# Patient Record
Sex: FEMALE | Race: WHITE | NOT HISPANIC OR LATINO | Employment: OTHER | URBAN - METROPOLITAN AREA
[De-identification: names, ages, dates, MRNs, and addresses within clinical notes are randomized per-mention and may not be internally consistent; named-entity substitution may affect disease eponyms.]

---

## 2017-09-13 ENCOUNTER — AMBULATORY - HEALTHEAST (OUTPATIENT)
Dept: PHYSICAL MEDICINE AND REHAB | Facility: CLINIC | Age: 77
End: 2017-09-13

## 2017-09-13 DIAGNOSIS — M54.16 LUMBAR RADICULOPATHY: ICD-10-CM

## 2017-09-14 ENCOUNTER — HOSPITAL ENCOUNTER (OUTPATIENT)
Dept: PHYSICAL MEDICINE AND REHAB | Facility: CLINIC | Age: 77
Discharge: HOME | End: 2017-09-14
Attending: FAMILY MEDICINE

## 2017-09-14 DIAGNOSIS — M79.18 MYOFASCIAL PAIN: ICD-10-CM

## 2017-09-14 DIAGNOSIS — M25.551 RIGHT HIP PAIN: ICD-10-CM

## 2017-09-14 DIAGNOSIS — M43.16 SPONDYLOLISTHESIS AT L4-L5 LEVEL: ICD-10-CM

## 2017-09-14 DIAGNOSIS — M54.16 LUMBAR RADICULITIS: ICD-10-CM

## 2017-09-14 DIAGNOSIS — M43.16 SPONDYLOLISTHESIS AT L3-L4 LEVEL: ICD-10-CM

## 2017-09-14 DIAGNOSIS — M47.816 FACET ARTHROPATHY, LUMBAR: ICD-10-CM

## 2017-09-14 DIAGNOSIS — M54.50 LUMBALGIA: ICD-10-CM

## 2017-09-14 ASSESSMENT — MIFFLIN-ST. JEOR: SCORE: 1089.34

## 2017-09-25 ENCOUNTER — HOSPITAL ENCOUNTER (OUTPATIENT)
Dept: RADIOLOGY | Facility: HOSPITAL | Age: 77
Discharge: HOME | End: 2017-09-25
Attending: PHYSICIAN ASSISTANT

## 2017-09-25 ENCOUNTER — HOSPITAL ENCOUNTER (OUTPATIENT)
Dept: MRI IMAGING | Facility: HOSPITAL | Age: 77
Discharge: HOME | End: 2017-09-25
Attending: PHYSICIAN ASSISTANT

## 2017-09-25 DIAGNOSIS — M12.88 OTHER SPECIFIC ARTHROPATHIES, NOT ELSEWHERE CLASSIFIED, OTHER SPECIFIED SITE: ICD-10-CM

## 2017-09-25 DIAGNOSIS — M79.18 MYOFASCIAL PAIN: ICD-10-CM

## 2017-09-25 DIAGNOSIS — M43.16 SPONDYLOLISTHESIS AT L4-L5 LEVEL: ICD-10-CM

## 2017-09-25 DIAGNOSIS — M54.16 LUMBAR RADICULITIS: ICD-10-CM

## 2017-09-25 DIAGNOSIS — M43.16 SPONDYLOLISTHESIS AT L3-L4 LEVEL: ICD-10-CM

## 2017-09-25 DIAGNOSIS — M54.50 LUMBALGIA: ICD-10-CM

## 2017-09-25 DIAGNOSIS — M47.816 FACET ARTHROPATHY, LUMBAR: ICD-10-CM

## 2017-09-25 DIAGNOSIS — M25.551 RIGHT HIP PAIN: ICD-10-CM

## 2017-09-28 ENCOUNTER — HOSPITAL ENCOUNTER (OUTPATIENT)
Dept: PHYSICAL MEDICINE AND REHAB | Facility: CLINIC | Age: 77
Discharge: HOME | End: 2017-09-28
Attending: PHYSICIAN ASSISTANT

## 2017-09-28 DIAGNOSIS — M79.18 MYOFASCIAL PAIN: ICD-10-CM

## 2017-09-28 DIAGNOSIS — M43.16 SPONDYLOLISTHESIS AT L4-L5 LEVEL: ICD-10-CM

## 2017-09-28 DIAGNOSIS — M25.551 RIGHT HIP PAIN: ICD-10-CM

## 2017-09-28 DIAGNOSIS — M54.16 LUMBAR RADICULITIS: ICD-10-CM

## 2017-09-28 DIAGNOSIS — M43.16 SPONDYLOLISTHESIS AT L3-L4 LEVEL: ICD-10-CM

## 2017-09-28 DIAGNOSIS — M47.816 FACET ARTHROPATHY, LUMBAR: ICD-10-CM

## 2017-09-28 DIAGNOSIS — M54.50 LUMBALGIA: ICD-10-CM

## 2017-09-29 ENCOUNTER — HOSPITAL ENCOUNTER (OUTPATIENT)
Dept: PHYSICAL MEDICINE AND REHAB | Facility: CLINIC | Age: 77
Discharge: HOME | End: 2017-09-29
Attending: PHYSICIAN ASSISTANT

## 2017-09-29 DIAGNOSIS — M43.16 SPONDYLOLISTHESIS AT L4-L5 LEVEL: ICD-10-CM

## 2017-09-29 DIAGNOSIS — M54.16 LUMBAR RADICULITIS: ICD-10-CM

## 2017-09-29 DIAGNOSIS — M43.16 SPONDYLOLISTHESIS AT L3-L4 LEVEL: ICD-10-CM

## 2017-09-29 DIAGNOSIS — M79.18 MYOFASCIAL PAIN: ICD-10-CM

## 2017-10-17 ENCOUNTER — HOSPITAL ENCOUNTER (OUTPATIENT)
Dept: PHYSICAL MEDICINE AND REHAB | Facility: CLINIC | Age: 77
Discharge: HOME | End: 2017-10-17
Attending: PHYSICIAN ASSISTANT

## 2017-10-17 DIAGNOSIS — M54.16 LUMBAR RADICULITIS: ICD-10-CM

## 2017-10-17 DIAGNOSIS — M25.551 RIGHT HIP PAIN: ICD-10-CM

## 2017-10-17 DIAGNOSIS — M54.50 LUMBALGIA: ICD-10-CM

## 2017-10-17 DIAGNOSIS — M43.16 SPONDYLOLISTHESIS AT L4-L5 LEVEL: ICD-10-CM

## 2017-10-17 DIAGNOSIS — M79.18 MYOFASCIAL PAIN: ICD-10-CM

## 2017-10-17 DIAGNOSIS — M43.16 SPONDYLOLISTHESIS AT L3-L4 LEVEL: ICD-10-CM

## 2017-10-17 ASSESSMENT — MIFFLIN-ST. JEOR: SCORE: 1087.98

## 2017-10-18 ENCOUNTER — OFFICE VISIT - HEALTHEAST (OUTPATIENT)
Dept: PHYSICAL THERAPY | Facility: CLINIC | Age: 77
End: 2017-10-18

## 2017-10-18 DIAGNOSIS — M54.50 CHRONIC BILATERAL LOW BACK PAIN WITHOUT SCIATICA: ICD-10-CM

## 2017-10-18 DIAGNOSIS — R29.898 WEAKNESS OF BOTH HIPS: ICD-10-CM

## 2017-10-18 DIAGNOSIS — G89.29 CHRONIC BILATERAL LOW BACK PAIN WITHOUT SCIATICA: ICD-10-CM

## 2017-10-18 DIAGNOSIS — M25.551 PAIN OF RIGHT HIP JOINT: ICD-10-CM

## 2017-10-27 ENCOUNTER — OFFICE VISIT - HEALTHEAST (OUTPATIENT)
Dept: PHYSICAL THERAPY | Facility: CLINIC | Age: 77
End: 2017-10-27

## 2017-10-27 DIAGNOSIS — M54.50 CHRONIC BILATERAL LOW BACK PAIN WITHOUT SCIATICA: ICD-10-CM

## 2017-10-27 DIAGNOSIS — G89.29 CHRONIC BILATERAL LOW BACK PAIN WITHOUT SCIATICA: ICD-10-CM

## 2017-10-27 DIAGNOSIS — R29.898 WEAKNESS OF BOTH HIPS: ICD-10-CM

## 2017-10-27 DIAGNOSIS — M25.551 PAIN OF RIGHT HIP JOINT: ICD-10-CM

## 2017-11-01 ENCOUNTER — OFFICE VISIT - HEALTHEAST (OUTPATIENT)
Dept: PHYSICAL THERAPY | Facility: CLINIC | Age: 77
End: 2017-11-01

## 2017-11-01 DIAGNOSIS — G89.29 CHRONIC BILATERAL LOW BACK PAIN WITHOUT SCIATICA: ICD-10-CM

## 2017-11-01 DIAGNOSIS — R29.898 WEAKNESS OF BOTH HIPS: ICD-10-CM

## 2017-11-01 DIAGNOSIS — M25.551 PAIN OF RIGHT HIP JOINT: ICD-10-CM

## 2017-11-01 DIAGNOSIS — M54.50 CHRONIC BILATERAL LOW BACK PAIN WITHOUT SCIATICA: ICD-10-CM

## 2017-11-08 ENCOUNTER — OFFICE VISIT - HEALTHEAST (OUTPATIENT)
Dept: PHYSICAL THERAPY | Facility: CLINIC | Age: 77
End: 2017-11-08

## 2017-11-08 DIAGNOSIS — R29.898 WEAKNESS OF BOTH HIPS: ICD-10-CM

## 2017-11-08 DIAGNOSIS — G89.29 CHRONIC BILATERAL LOW BACK PAIN WITHOUT SCIATICA: ICD-10-CM

## 2017-11-08 DIAGNOSIS — M25.551 PAIN OF RIGHT HIP JOINT: ICD-10-CM

## 2017-11-08 DIAGNOSIS — M54.50 CHRONIC BILATERAL LOW BACK PAIN WITHOUT SCIATICA: ICD-10-CM

## 2017-11-15 ENCOUNTER — OFFICE VISIT - HEALTHEAST (OUTPATIENT)
Dept: PHYSICAL THERAPY | Facility: CLINIC | Age: 77
End: 2017-11-15

## 2017-11-15 DIAGNOSIS — M54.50 CHRONIC BILATERAL LOW BACK PAIN WITHOUT SCIATICA: ICD-10-CM

## 2017-11-15 DIAGNOSIS — G89.29 CHRONIC BILATERAL LOW BACK PAIN WITHOUT SCIATICA: ICD-10-CM

## 2017-11-15 DIAGNOSIS — M25.551 PAIN OF RIGHT HIP JOINT: ICD-10-CM

## 2017-11-15 DIAGNOSIS — R29.898 WEAKNESS OF BOTH HIPS: ICD-10-CM

## 2017-11-27 ENCOUNTER — HOSPITAL ENCOUNTER (OUTPATIENT)
Dept: PHYSICAL MEDICINE AND REHAB | Facility: CLINIC | Age: 77
Discharge: HOME | End: 2017-11-27
Attending: PHYSICIAN ASSISTANT

## 2017-11-27 DIAGNOSIS — M25.551 RIGHT HIP PAIN: ICD-10-CM

## 2017-11-27 DIAGNOSIS — M54.16 LUMBAR RADICULITIS: ICD-10-CM

## 2017-11-27 DIAGNOSIS — M43.16 SPONDYLOLISTHESIS AT L4-L5 LEVEL: ICD-10-CM

## 2017-11-27 DIAGNOSIS — M54.50 LUMBALGIA: ICD-10-CM

## 2017-11-27 DIAGNOSIS — M43.16 SPONDYLOLISTHESIS AT L3-L4 LEVEL: ICD-10-CM

## 2017-11-27 DIAGNOSIS — M79.18 MYOFASCIAL PAIN: ICD-10-CM

## 2017-11-27 ASSESSMENT — MIFFLIN-ST. JEOR: SCORE: 1082.53

## 2017-12-05 ENCOUNTER — RECORDS - HEALTHEAST (OUTPATIENT)
Dept: ADMINISTRATIVE | Facility: OTHER | Age: 77
End: 2017-12-05

## 2017-12-05 LAB
LAB AP CHARGES (HE HISTORICAL CONVERSION): NORMAL
PATH REPORT.COMMENTS IMP SPEC: NORMAL
PATH REPORT.COMMENTS IMP SPEC: NORMAL
PATH REPORT.FINAL DX SPEC: NORMAL
PATH REPORT.GROSS SPEC: NORMAL
PATH REPORT.MICROSCOPIC SPEC OTHER STN: NORMAL
PATH REPORT.RELEVANT HX SPEC: NORMAL
RESULT FLAG (HE HISTORICAL CONVERSION): NORMAL

## 2018-02-08 ENCOUNTER — OFFICE VISIT - HEALTHEAST (OUTPATIENT)
Dept: NEUROSURGERY | Facility: CLINIC | Age: 78
End: 2018-02-08

## 2018-02-08 DIAGNOSIS — M48.062 NEUROGENIC CLAUDICATION DUE TO LUMBAR SPINAL STENOSIS: ICD-10-CM

## 2018-02-08 DIAGNOSIS — M43.10 SPONDYLOLISTHESIS: ICD-10-CM

## 2018-02-08 ASSESSMENT — MIFFLIN-ST. JEOR: SCORE: 1078.91

## 2018-02-12 ENCOUNTER — HOSPITAL ENCOUNTER (OUTPATIENT)
Dept: CT IMAGING | Facility: HOSPITAL | Age: 78
Discharge: HOME | End: 2018-02-12
Attending: NEUROLOGICAL SURGERY

## 2018-02-12 ENCOUNTER — HOSPITAL ENCOUNTER (OUTPATIENT)
Dept: RADIOLOGY | Facility: HOSPITAL | Age: 78
Discharge: HOME | End: 2018-02-12
Attending: NEUROLOGICAL SURGERY

## 2018-02-12 DIAGNOSIS — M43.10 SPONDYLOLISTHESIS: ICD-10-CM

## 2018-02-22 ENCOUNTER — OFFICE VISIT - HEALTHEAST (OUTPATIENT)
Dept: NEUROSURGERY | Facility: CLINIC | Age: 78
End: 2018-02-22

## 2018-02-22 DIAGNOSIS — M43.16 SPONDYLOLISTHESIS AT L4-L5 LEVEL: ICD-10-CM

## 2018-02-22 DIAGNOSIS — M48.062 SPINAL STENOSIS, LUMBAR REGION WITH NEUROGENIC CLAUDICATION: ICD-10-CM

## 2018-02-22 ASSESSMENT — MIFFLIN-ST. JEOR: SCORE: 1078.91

## 2018-03-15 ENCOUNTER — OFFICE VISIT - HEALTHEAST (OUTPATIENT)
Dept: NEUROSURGERY | Facility: CLINIC | Age: 78
End: 2018-03-15

## 2018-03-15 DIAGNOSIS — M25.551 RIGHT HIP PAIN: ICD-10-CM

## 2018-03-15 DIAGNOSIS — M48.062 NEUROGENIC CLAUDICATION DUE TO LUMBAR SPINAL STENOSIS: ICD-10-CM

## 2018-03-15 ASSESSMENT — MIFFLIN-ST. JEOR: SCORE: 1078.91

## 2019-03-20 ENCOUNTER — RECORDS - HEALTHEAST (OUTPATIENT)
Dept: LAB | Facility: CLINIC | Age: 79
End: 2019-03-20

## 2019-03-21 LAB
BACTERIA SPEC CULT: ABNORMAL
BACTERIA SPEC CULT: ABNORMAL

## 2019-09-26 ENCOUNTER — RECORDS - HEALTHEAST (OUTPATIENT)
Dept: LAB | Facility: CLINIC | Age: 79
End: 2019-09-26

## 2019-09-26 LAB
FASTING STATUS PATIENT QL REPORTED: NORMAL
GLUCOSE BLD-MCNC: 100 MG/DL (ref 70–125)

## 2020-02-28 ENCOUNTER — RECORDS - HEALTHEAST (OUTPATIENT)
Dept: LAB | Facility: HOSPITAL | Age: 80
End: 2020-02-28

## 2020-02-28 ENCOUNTER — RECORDS - HEALTHEAST (OUTPATIENT)
Dept: ADMINISTRATIVE | Facility: OTHER | Age: 80
End: 2020-02-28

## 2020-02-28 LAB — C REACTIVE PROTEIN LHE: 1.2 MG/DL (ref 0–0.8)

## 2020-03-09 ENCOUNTER — HOSPITAL ENCOUNTER (OUTPATIENT)
Dept: MRI IMAGING | Facility: HOSPITAL | Age: 80
Discharge: HOME | End: 2020-03-09
Attending: PSYCHIATRY & NEUROLOGY

## 2020-03-09 DIAGNOSIS — R51.9 BILATERAL HEADACHE: ICD-10-CM

## 2020-03-09 DIAGNOSIS — S06.0XAA: ICD-10-CM

## 2020-07-23 PROBLEM — R51.9 BILATERAL HEADACHE: Status: ACTIVE | Noted: 2020-07-23

## 2020-07-23 PROBLEM — S06.0X0A CONCUSSION WITHOUT LOSS OF CONSCIOUSNESS: Status: ACTIVE | Noted: 2020-07-23

## 2020-07-24 ENCOUNTER — OFFICE VISIT (OUTPATIENT)
Dept: NEUROLOGY | Facility: CLINIC | Age: 80
End: 2020-07-24
Payer: MEDICARE

## 2020-07-24 VITALS — BODY MASS INDEX: 25.69 KG/M2 | WEIGHT: 145 LBS | HEIGHT: 63 IN

## 2020-07-24 DIAGNOSIS — R51.9 BILATERAL HEADACHE: ICD-10-CM

## 2020-07-24 DIAGNOSIS — S06.0X0S CONCUSSION WITHOUT LOSS OF CONSCIOUSNESS, SEQUELA (H): Primary | ICD-10-CM

## 2020-07-24 PROCEDURE — 99442 ZZC PHYSICIAN TELEPHONE EVALUATION 11-20 MIN: CPT | Performed by: PSYCHIATRY & NEUROLOGY

## 2020-07-24 RX ORDER — NICOTINE POLACRILEX 4 MG/1
GUM, CHEWING ORAL
COMMUNITY

## 2020-07-24 RX ORDER — NORTRIPTYLINE HCL 10 MG
CAPSULE ORAL
COMMUNITY
Start: 2020-04-17 | End: 2020-10-20

## 2020-07-24 RX ORDER — NAPROXEN SODIUM 220 MG
220 TABLET ORAL
COMMUNITY
Start: 2020-04-17

## 2020-07-24 RX ORDER — IBUPROFEN 200 MG
200 TABLET ORAL
COMMUNITY

## 2020-07-24 SDOH — HEALTH STABILITY: MENTAL HEALTH: HOW OFTEN DO YOU HAVE A DRINK CONTAINING ALCOHOL?: MONTHLY OR LESS

## 2020-07-24 ASSESSMENT — MIFFLIN-ST. JEOR: SCORE: 1083.91
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
## 2020-10-01 ENCOUNTER — RECORDS - HEALTHEAST (OUTPATIENT)
Dept: LAB | Facility: CLINIC | Age: 80
End: 2020-10-01

## 2020-10-01 LAB
ANION GAP SERPL CALCULATED.3IONS-SCNC: 9 MMOL/L (ref 5–18)
BUN SERPL-MCNC: 11 MG/DL (ref 8–28)
CALCIUM SERPL-MCNC: 9.2 MG/DL (ref 8.5–10.5)
CHLORIDE BLD-SCNC: 103 MMOL/L (ref 98–107)
CO2 SERPL-SCNC: 26 MMOL/L (ref 22–31)
CREAT SERPL-MCNC: 0.89 MG/DL (ref 0.6–1.1)
GFR SERPL CREATININE-BSD FRML MDRD: >60 ML/MIN/1.73M2
GLUCOSE BLD-MCNC: 106 MG/DL (ref 70–125)
POTASSIUM BLD-SCNC: 4 MMOL/L (ref 3.5–5)
SODIUM SERPL-SCNC: 138 MMOL/L (ref 136–145)
TSH SERPL DL<=0.005 MIU/L-ACNC: 2.15 UIU/ML (ref 0.3–5)

## 2020-10-20 ENCOUNTER — OFFICE VISIT (OUTPATIENT)
Dept: NEUROLOGY | Facility: CLINIC | Age: 80
End: 2020-10-20
Payer: MEDICARE

## 2020-10-20 VITALS — BODY MASS INDEX: 26.22 KG/M2 | WEIGHT: 148 LBS | HEIGHT: 63 IN

## 2020-10-20 DIAGNOSIS — S06.0X0S CONCUSSION WITHOUT LOSS OF CONSCIOUSNESS, SEQUELA (H): Primary | ICD-10-CM

## 2020-10-20 DIAGNOSIS — R51.9 BILATERAL HEADACHE: ICD-10-CM

## 2020-10-20 PROCEDURE — 99441 PR PHYSICIAN TELEPHONE EVALUATION 5-10 MIN: CPT | Performed by: PSYCHIATRY & NEUROLOGY

## 2020-10-20 ASSESSMENT — MIFFLIN-ST. JEOR: SCORE: 1092.51
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
## 2021-02-15 ENCOUNTER — AMBULATORY - HEALTHEAST (OUTPATIENT)
Dept: NURSING | Facility: CLINIC | Age: 81
End: 2021-02-15

## 2021-03-08 ENCOUNTER — AMBULATORY - HEALTHEAST (OUTPATIENT)
Dept: NURSING | Facility: CLINIC | Age: 81
End: 2021-03-08

## 2021-05-31 VITALS — WEIGHT: 145 LBS | BODY MASS INDEX: 25.69 KG/M2 | HEIGHT: 63 IN

## 2021-05-31 VITALS — HEIGHT: 63 IN | WEIGHT: 145.8 LBS | BODY MASS INDEX: 25.83 KG/M2

## 2021-05-31 VITALS — WEIGHT: 146.9 LBS | BODY MASS INDEX: 26.44 KG/M2

## 2021-05-31 VITALS — WEIGHT: 147 LBS | BODY MASS INDEX: 26.05 KG/M2 | HEIGHT: 63 IN

## 2021-05-31 VITALS — HEIGHT: 63 IN | WEIGHT: 147.3 LBS | BODY MASS INDEX: 26.1 KG/M2

## 2021-06-01 VITALS — WEIGHT: 145 LBS | BODY MASS INDEX: 25.69 KG/M2 | HEIGHT: 63 IN

## 2021-06-01 VITALS — HEIGHT: 63 IN | BODY MASS INDEX: 25.69 KG/M2 | WEIGHT: 145 LBS

## 2021-06-13 NOTE — PROGRESS NOTES
[facility] Certification Request    October 18, 2017      Patient: Grazyna Bejarano  MR Number: [id number]  YOB: 1940  Date of Visit: 10/18/2017      Dear Ezra Torres PA-C:    Thank you for this referral.   We are seeing Grazyna Bejarano for Physical Therapy of R hip pain and LPB.    Medicare and/or Medicaid requires physician review and approval of the treatment plan. Please review the plan of care and verify that you agree with the therapy plan of care by co-signing this note.      Plan of Care  Authorization / Certification Start Date: 10/18/17  Authorization / Certification End Date: 01/18/18  Authorization / Certification Number of Visits: 12  Communication with: Referral Source  Patient Related Instruction: Nature of Condition;Posture;Body mechanics;Precautions;Treatment plan and rationale;Next steps;Expected outcome;Self Care instruction;Basis of treatment  Times per Week: 1-2  Number of Weeks: 10-12  Number of Visits: 10-12  Discharge Planning: pt will meet all PT goals or reach a plateau with PT  Precautions / Restrictions : h/o cancer in R leg  Therapeutic Exercise: Strengthening;ROM;Stretching  Neuromuscular Reeducation: kinesio tape;posture;core;TNE  Manual Therapy: soft tissue mobilization;myofascial release;joint mobilization;muscle energy  Modalities: TENS;ultrasound;cold pack;hot pack    Goals:  Pt. will be independent with home exercise program in : 4 weeks  Pt. will report decreased intensity, frequency of : Pain;in 4 weeks;Comment  Comment:: 50%  Patient will decrease : ANTONIO score;by _ points;for improved quality of function;in 4 weeks  by ___ points: 30%  Pt will: be able to clean and vacuum her house with pain <3/10 in 6 weeks:  Pt will: be able to go up and down stairs in 6 weeks without fear of falling 2/2 pain:      If you have any questions or concerns, please don't hesitate to call.    Sincerely,      Marcelino SARMIENTO, PT        Physician recommendation:      ___ Follow therapist's recommendation        ___ Modify therapy      *Physician co-signature indicates they certify the need for these services furnished within this plan and while under their care.      [facility]   [facility]-P[facility] Initial Evaluation    Patient Name: Grazyna Bejarano  Date of evaluation: 10/18/2017  Referral Diagnosis:   Lumbalgia [M54.5]  - Primary       Lumbar radiculitis [M54.16]       Myofascial pain [M79.1]       Spondylolisthesis at L4-L5 level [M43.16]       Spondylolisthesis at L3-L4 level [M43.16]       Right hip pain [M25.551]       Referring provider: Ezra Torres PA-C  Visit Diagnosis:     ICD-10-CM    1. Pain of right hip joint M25.551    2. Weakness of both hips R29.898    3. Chronic bilateral low back pain without sciatica M54.5     G89.29        Assessment:        Grazyna Bejarano is a 77 y.o. female who presents to therapy today with chief complaints of chronic right hip pain and back pain over the last 3 years. She has not history of trauma or surgery. LBP and radicular leg pain improved with injection on 9/29/17, but hip pain remains. She is limited with going up and down stairs, transitional movements, and walking 2/2 hip pain. With examination, she demonstrates mild hip ROM and strength deficits and her pain is most consistent with hip OA and possible labral tear. The patient reports improvements within session today after stretching and she will likely benefit from skilled PT to improve her hip strength and mobility as well as core stabilization to improve her pain, mobility and function.     POC and pathology of condition were reviewed with patient.  Pt. is in agreement with the Plan of Care  A Home Exercise Program (HEP) was initiated today.  Pt. was instructed in exercises by PT and patient was given a handout with detailed instructions.    Goals:  Pt. will be independent with home exercise program in : 4 weeks  Pt. will report decreased intensity, frequency of :  Pain;in 4 weeks;Comment  Comment:: 50%  Patient will decrease : ANTONIO score;by _ points;for improved quality of function;in 4 weeks  by ___ points: 30%  Pt will: be able to clean and vacuum her house with pain <3/10 in 6 weeks:  Pt will: be able to go up and down stairs in 6 weeks without fear of falling 2/2 pain:    Patient's expectations/goals are realistic.    Barriers to Learning or Achieving Goals:  No Barriers.       Plan / Patient Instructions:        Plan of Care:   Authorization / Certification Start Date: 10/18/17  Authorization / Certification End Date: 01/18/18  Authorization / Certification Number of Visits: 12  Communication with: Referral Source  Patient Related Instruction: Nature of Condition;Posture;Body mechanics;Precautions;Treatment plan and rationale;Next steps;Expected outcome;Self Care instruction;Basis of treatment  Times per Week: 1-2  Number of Weeks: 10-12  Number of Visits: 10-12  Discharge Planning: pt will meet all PT goals or reach a plateau with PT  Precautions / Restrictions : h/o cancer in R leg  Therapeutic Exercise: Strengthening;ROM;Stretching  Neuromuscular Reeducation: kinesio tape;posture;core;TNE  Manual Therapy: soft tissue mobilization;myofascial release;joint mobilization;muscle energy  Modalities: TENS;ultrasound;cold pack;hot pack    Plan for next visit: Review HEP, slowly progress hip stretching and strengthening, Hip joint mobilizations. Eventual core strengthening.     Subjective:         Social information:   Occupation:retired   Work Status:NA    History of Present Illness:      Pain started about three years ago with a pain in the middle of the left glut and was occasional going up and down stairs. Then switched to the right. Always has felt weak in the back and uses pillow behind the back. Was afraid of falling down the stairs due to hip pain and did PT which helped (3 years ago). 2 years ago had PT again which made her more sore. Walked on the beach in [place] a  couple of years ago and tried walking in the water and she started having hip pain in the groin. Still worse the more she did. Had injection for the back 17 which helped with anterior thigh and lower leg neural symptoms as well as weakness. Hip continues to bother her even though the back is feeling good.   Pain described as click pain in the hip, aching, dull in groin.  Worse with going up stairs, walking on even or uneven surfaces, lifting, bending, twisting on hip, kneeling/squatting, golfing, housework, groceries.   Better with sitting, heat, advil, injections.  Previous treatment PT and injection for back.  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       Pain Ratin  Pain rating at best: 0  Pain rating at worst: 7  Pain description: aching, dull, pain, sharp and catch/click    Functional limitations are described as occurring with:   going up stairs, walking on even or uneven surfaces, lifting, bending, twisting on hip, kneeling/squatting, golfing, housework, groceries.          Objective:      Note: Items left blank indicates the item was not performed or not indicated at the time of the evaluation.    Patient Outcome Measures :    Modified Oswestry Low Back Pain Disablity Questionnaire  in %: 36   Scores range from 0-100%, where a score of 0% represents minimal pain and maximal function. The minimal clinically important difference is a score reduction of 12%.    Examination  1. Pain of right hip joint     2. Weakness of both hips     3. Chronic bilateral low back pain without sciatica       Precautions/Restrictions: h/o squamous cell carcinoma in RLE  Involved side: Right  Posture Observation:      General sitting posture is  normal.  General standing posture is normal.    Lumbar ROM:    Date: 10/18/17     *Indicate scale AROM AROM AROM   Lumbar Flexion 20 cm     Lumbar Extension WNL      Right Left Right Left Right Left   Lumbar Sidebending WNL Mild pain on L       Lumbar  Rotation         Thoracic Rotation           Hip ROM  Date: 10/18/17     Hip ROM( )       PROM in degrees PROM in degrees PROM in degrees    Right Left Right Left Right Left   Hip Flexion (0-120 ) WNL Pain in front of R hip/groin WNL       Hip Abduction (0-45 )         Hip External Rotation (0-50 ) 45 50       Hip Internal Rotation (0-40 ) 45 mild pain 50       Hip Extension (0-15 ) 10 15         SLR 80/80    Lower Extremity Strength:     Date: 10/18/17     LE strength/5 Right Left Right Left Right Left   Hip Flexion (L1-3) 5- pain in front of right hip 5       Hip Extension (L5-S1) 4+ 5       Hip Abduction (L4-5) 5- 5-       Hip Adduction (L2-3)         Hip External Rotation         Hip Internal Rotation         Knee Extension (L3-4) 5 5       Knee Flexion 5 5       Ankle Dorsiflexion (L4-5) 5 5       Great Toe Extension (L5) 5 5       Ankle Plantar flexion (S1) 5 5       Abdominals        Sensation    WNL to lt touch sensation screen    Reflex Testing  Lumbar Dermatomes Right Left UE Reflexes Right Left   Iliac Crest and Groin (L1)   Biceps (C5-6)     Anterior Medial Thigh (L2)   Brachioradialis (C5-6)     Anterior Thigh, Medial Epicondyle Femur (L3)   Triceps (C7-8)     Lateral Thigh, Anterior Knee, Medial Leg/Malleolus (L4)   Gustavo s test     Lateral Leg, Dorsal Foot (L5)   LE Reflexes     Lateral Foot (S1)   Patellar (L3-4)     Posterior Leg (S2)   Achilles (S1-2)     Other:   Babinski Response       Palpation: Non-tender throughout lumbar spine, SI, gluts, ITB, or over greater trochanter    Lumbar Special Tests:     Lumbar Special Tests Right Left SI Tests Right  Left   Quadrant test   SI Compression     Straight leg raise 80 80 SI Distraction     Crossover response   POSH Test - -   Slump - - Sacral Thrust - -   Sit-up test  FADIR + Hip -   Trunk extensor endurance test  Resisted Abduction - -   Prone instability test  PERCY + hip -   Pubic shotgun  Other: Hip Scour - -     Hip Special Tests  OA Right  (+/-) Left (+/-) Intra Articular Right (+/-) Left (+/-)   Hip Scour - - PERCY + hip    Test Cluster  -Hip pain  -Hip IR <15   -Hip Flex <115  - - FADIR + hip    Test Cluster  -Painful Hip IR  ->50 years old  -Morning Stiffness <60 min + - Passive Supine Rotation Test - -   Misc. Right (+/-) Left (+/-) Stinchfield Test (SLR Against Resistance) - -   Ely s - - DEXRIT     Faith s   DIRI     Trendelenburg - - Posterior Rim Impingement     SIJ Right (+/-) Left (+/-) Lateral Rim Impingement     SIJ Compression - - Other     SIJ Distraction - - Other     POSH Test - - Other     Sacral Thrust - - Other       Repeated Motion Testing:  Does not centralize  Does not peripheralize    Passive Mobility - Joint Integrity:  Not tested    Treatment Today   =  TREATMENT MINUTES COMMENTS   Evaluation 25 Low complexity   Self-care/ Home management     Manual therapy     Neuromuscular Re-education     Therapeutic Activity     Therapeutic Exercises 25 Education on hip anatomy, OA, spondylolisthesis, review and previous HEP and initiation of new HEP   Gait training     Modality__________________                Total 50    Blank areas are intentional and mean the treatment did not include these items.     PT Evaluation Code: (Please list factors):  Patient History/Comorbidities: h/o squamous cell carcinoma in R LE. Spondylolisthesis L3-L4 and L4-L5  Examination: see objective  Clinical Presentation: stable  Clinical Decision Making: low    Patient History/  Comorbidities Examination  (body structures and functions, activity limitations, and/or participation restrictions) Clinical Presentation Clinical Decision Making (Complexity)   No documented Comorbidities or personal factors 1-2 Elements Stable and/or uncomplicated Low   1-2 documented comorbidities or personal factor 3 Elements Evolving clinical presentation with changing characteristics Moderate   3-4 documented comorbidities or personal factors 4 or more Unstable and unpredictable  High Marcelino SARMIENTO  10/18/2017  1:53 PM

## 2021-06-13 NOTE — PROGRESS NOTES
Assessment / Plan:     Diagnoses and all orders for this visit:    Lumbalgia    Lumbar radiculitis    Myofascial pain    Spondylolisthesis at L4-L5 level    Spondylolisthesis at L3-L4 level    Right hip pain          Grazyna Bejarano is a 77 y.o. y.o. female who presents today for follow-up regarding bilateral low back pain radiating to the lower extremity bilaterally with heaviness and weakness of the legs, and right hip pain.  Today patient reports complete resolution of the low back pain and the radicular pain and weakness to the lower extremity.  She also reports mild improvement in the right hip pain.  She still complains of right hip pain that is mild in nature.  X-rays of the right hip showed mild to moderate degenerative joint disease.  Patient symptoms of the right hip pain could be due to the degenerative changes in the right hip.  Patient declines right hip MRI.  I recommend patient to start with physical therapy for the lower back and the right hip.  If symptoms of the right hip pain does not improve we could consider right hip repeat extremity injection in 3-4 weeks.  No red flags.    A shared decision making plan was used.  The patient's values and choices were respected.  The following represents what was discussed and decided upon by the physician and the patient.      1.  DIAGNOSTIC TESTS: I reviewed the lumbar MRI imaging and the report with the patient today.  He verbalizes understanding.  2.  PHYSICAL THERAPY: Patient will start on physical therapy  program.  3.  MEDICATIONS: Patient will continue on ibuprofen as needed for pain.  4.  INTERVENTIONS: No therapeutic injections at this time.  5.  PATIENT EDUCATION: I thoroughly discussed the plan with the patient today.  She verbalizes understanding and agrees with the plan.  6.  FOLLOW-UP: Patient will follow up with me in 7 weeks.  All questions were answered.      CARL Ervin, MPA-C      Subjective:     Grazyna Bejarano is a 77 y.o. female  who presents today for follow-up regarding low back pain radiating to the right and to the left lower extremity.  Patient also reports right hip groin pain.  Today patient reports complete resolution of the low back pain and the heaviness in the weakness in her legs.  She also reports mild improvement in the right hip pain with the procedure, however she still reports right groin pain.  At this time she reports 3 out of 10 intensity pain in the right groin, that is worse with walking.  Right hip x-rays showed mild to moderate degenerative joint disease.  Patient was wondering about traveling with her  in his vehicle to [place].Patient denies urinary or bowel incontinence, unintentional weight loss, saddle anesthesia, fever or chills, balance difficulties.      Review of Systems:  Right groin pain. Patient denies urinary or bowel incontinence, unintentional weight loss, saddle anesthesia, fever or chills, balance difficulties.       Objective:   CONSTITUTIONAL:  Vital signs as above.  No acute distress.  The patient is well nourished and well groomed.    PSYCHIATRIC:  The patient is awake, alert, oriented to person, place and time.  The patient is answering questions appropriately with clear speech.  Normal affect.  SKIN:  Skin over the face, posterior torso, bilateral upper and lower extremities is clean, dry, intact without rashes.  MUSCULOSKELETAL:  Gait is non-antalgic.  The patient is able to heel and toe walk without any difficulty.  No tenderness over the L4-L5 L5-S1 lumbar paraspinal muscles.      The patient has 5/5 strength for the bilateral hip flexors, knee flexors/extensors, ankle dorsiflexors/plantar flexors, ankle evertors/invertors.    NEUROLOGICAL:  2/4 patellar, medial hamstring, achilles reflexes which are symmetric bilaterally.  No ankle clonus bilaterally.  Sensation to light touch is intact in the bilateral L4, L5, and S1 dermatomes.       RESULTS:      XR HIP RIGHT 2 OR MORE  PHILLIP  9/25/2017 5:09 PM     INDICATION: Hip pain.      COMPARISON: None.     FINDINGS: Small osteophytes are seen on the femoral head. Is mild joint space narrowing. No fractures or other osseous abnormalities are identified. The findings are consistent with mild to moderate degenerative joint disease.

## 2021-06-13 NOTE — PROGRESS NOTES
[facility] Daily Progress     Patient Name: Grazyna Bejarano  Date: 11/1/2017  Visit #: 3  PTA visit #:  -  Referral Diagnosis:   Lumbalgia [M54.5]  - Primary       Lumbar radiculitis [M54.16]       Myofascial pain [M79.1]       Spondylolisthesis at L4-L5 level [M43.16]       Spondylolisthesis at L3-L4 level [M43.16]       Right hip pain [M25.551]       Referring provider: Ezra Torres PA-C  Visit Diagnosis:     ICD-10-CM    1. Pain of right hip joint M25.551    2. Weakness of both hips R29.898    3. Chronic bilateral low back pain without sciatica M54.5     G89.29      Grazyna Bejarano is a 77 y.o. female who presents to therapy today with chief complaints of chronic right hip pain and back pain over the last 3 years. She has not history of trauma or surgery. LBP and radicular leg pain improved with injection on 9/29/17, but hip pain remains. She is limited with going up and down stairs, transitional movements, and walking 2/2 hip pain. With examination, she demonstrates mild hip ROM and strength deficits and her pain is most consistent with hip OA and possible labral tear. The patient reports improvements within session today after stretching and she will likely benefit from skilled PT to improve her hip strength and mobility as well as core stabilization to improve her pain, mobility and function.     Assessment:     HEP/POC compliance is  good .     Patient continues to report hip pain and unsure if it is improved. She did feel some benefit from hip joint mobilizations and was able to progress her exercises well today. Her pain does improve with movement, but with catching type pain may be some impingement or a labral tear contributing to her pain. Will continue to promote stretching, joint mobs, and strengthening.     Goal Status:  Pt. will be independent with home exercise program in : 4 weeks  Pt. will report decreased intensity, frequency of : Pain;in 4 weeks;Comment  Comment:: 50%  Patient will  decrease : ANTONIO score;by _ points;for improved quality of function;in 4 weeks  by ___ points: 30%  Pt will: be able to clean and vacuum her house with pain <3/10 in 6 weeks:  Pt will: be able to go up and down stairs in 6 weeks without fear of falling 2/2 pain:    Plan / Patient Education:     Continue with initial plan of care.     Plan for next visit: Review HEP, slowly progress hip stretching and strengthening, Hip joint mobilizations. Eventual core strengthening    Subjective:     Pain Ratin/10 currently, worse in AM.     She has been doing the exercises, at first they were tough, gets through them, but not able to do a lot of repetitions. She was having some discomfort this AM- had a 2 hour class this AM. Back still feels like it is doing well.     Objective:     Clamshell painfree today  Mild pinch with isaías pose in front of right hip.    Treatment Today     TREATMENT MINUTES COMMENTS   Evaluation     Self-care/ Home management     Manual therapy 3 Supine RLE distraction with belt,   Neuromuscular Re-education     Therapeutic Activity     Therapeutic Exercises 23 Review and progression of HEP   Gait training     Modality__________________                Total 26    Blank areas are intentional and mean the treatment did not include these items.       Marcelino SARMIENTO  2017

## 2021-06-13 NOTE — PROGRESS NOTES
Assessment / Plan:     Diagnoses and all orders for this visit:    Lumbalgia    Right hip pain    Lumbar radiculitis  -     OPS Interlaminar Epidural Steroid Injection Lumbar; Future; Expected date: 9/28/17    Myofascial pain  -     OPS Interlaminar Epidural Steroid Injection Lumbar; Future; Expected date: 9/28/17    Spondylolisthesis at L4-L5 level  -     OPS Interlaminar Epidural Steroid Injection Lumbar; Future; Expected date: 9/28/17    Spondylolisthesis at L3-L4 level  -     OPS Interlaminar Epidural Steroid Injection Lumbar; Future; Expected date: 9/28/17    Facet arthropathy, lumbar          Grazyna Bejarano is a 77 y.o. y.o. female who presents today for follow-up regarding bilateral low back pain radiating to the gluteal region, groin, lower legs bilaterally with the right side being worse than the left side with weakness.  Patient symptoms has been present for the last 2-3 years.  She underwent physical therapy for the last 1-2 years without significant pain relief.  Updated lumbar MRI that was done on September 26, 2017 shows moderate to severe spinal canal stenosis at the L4-L5 due to broad based posterior annular bulge and ligamentum flavum and facet arthropathy, that is probably contributing to patient weakness in the lower legs.  Right hip x-rays showed mild osteophyte at the femoral head with mild to moderate degenerative joint disease that does not explain the right hip/groin pain.  There is no neural foraminal narrowing or significant disc herniation at the L1-L2, L2-L3 to explain patient groin pain with the right side being worse than the left side.  The moderate to severe spinal canal stenosis at the L4-L5 could contribute to patient's symptoms of the weakness of the lower extremity.  I recommend patient to proceed with interlaminar L4-L5 to try to help alleviate her symptoms, of the weakness of the lower extremity, and to continue with physical therapy.  No red flags.    A shared decision making  plan was used.  The patient's values and choices were respected.  The following represents what was discussed and decided upon by the physician and the patient.      1.  DIAGNOSTIC TESTS: I reviewed the lumbar MRI imaging and the report with the patient today.  He verbalizes understanding.  2.  PHYSICAL THERAPY: Patient will continue on physical therapy [facility] program.  3.  MEDICATIONS: Patient will continue on ibuprofen 200 mg as needed for pain.  4.  INTERVENTIONS: I recommend interlaminar L4-L5 therapeutic steroid injection to help with the weakness of the lower extremity.  5.  PATIENT EDUCATION: I thoroughly discussed the plan with the patient today.  She verbalizes understanding and agrees with the plan.  6.  FOLLOW-UP: Patient will follow up with me in 2  weeks.  All questions were answered.      CARL Ervin, MPA-C      Subjective:     Grazyna Bejarano is a 77 y.o. female who presents today for follow-up regarding low back pain radiating to the right and to the left lower extremity.  Patient reports the pain has been radiating to the right groin, and as well as to the left gluteal, left groin.  Patient stated that her symptoms keep changing and she describes weakness of the legs.  At this time she reports her pain at 5-6 out of 10 intensity in the lower back radiating to the lower extremity with weakness when she is walking.  Her symptoms are aggravated by standing, walking and rates it at 7 out of 10 intensity on its worse.  Her symptoms are mildly alleviated by taking ibuprofen 200 mg as needed for pain.  The updated lumbar MRI that was done on September 25 of 2017, shows moderate to severe spinal canal stenosis at the L4-L5 due to broad based posterior annular bulge and ligamentum flavum and facet arthropathy.  There is mild spinal canal stenosis at the L2-L3 and slight anterolisthesis L3-L4 and about 1.5 mm without neural foraminal narrowing.  Right hip x-ray shows only small osteophytes at the femoral  head with mild joint space narrowing and no fracture or other osseous abnormalities.    Today patient states today that she had tried physical therapy for at least 2 years without significant pain relief.Patient denies urinary or bowel incontinence, unintentional weight loss, saddle anesthesia, fever or chills, balance difficulties.      Review of Systems:  Bilateral low back pain radiating to the gluteal region, and the groin region with the right more than the left with weakness of the lower extremity. Patient denies urinary or bowel incontinence, unintentional weight loss, saddle anesthesia, fever or chills, balance difficulties.       Objective:   CONSTITUTIONAL:  Vital signs as above.  No acute distress.  The patient is well nourished and well groomed.    PSYCHIATRIC:  The patient is awake, alert, oriented to person, place and time.  The patient is answering questions appropriately with clear speech.  Normal affect.  SKIN:  Skin over the face, posterior torso, bilateral upper and lower extremities is clean, dry, intact without rashes.  MUSCULOSKELETAL:  Gait is non-antalgic.  The patient is able to heel and toe walk without any difficulty.  Mild tenderness over the L5-S1 lumbar paraspinal muscles.      The patient has 5/5 strength for the bilateral hip flexors, knee flexors/extensors, ankle dorsiflexors/plantar flexors, ankle evertors/invertors.    NEUROLOGICAL:  2/4 patellar, medial hamstring, achilles reflexes which are symmetric bilaterally.  No ankle clonus bilaterally.  Sensation to light touch is intact in the bilateral L4, L5, and S1 dermatomes.       RESULTS:      XR Hip Right 2 or More VWS (Order [id number])   Imaging   Date: 9/25/2017 Department: [facility] Released By: Grazyna Luna Authorizing: Ezra Torres PA-C   Study Result   XR HIP RIGHT 2 OR MORE VWS  9/25/2017 5:09 PM     INDICATION: Hip pain.      COMPARISON: None.     FINDINGS: Small osteophytes are seen on the  femoral head. Is mild joint space narrowing. No fractures or other osseous abnormalities are identified. The findings are consistent with mild to moderate degenerative joint disease.        MR Lumbar Spine Without Contrast (Order [id number])   Imaging   Date: 9/25/2017 Department: [facility] Released By: Grazyna Luna Authorizing: Ezra Torres PA-C   Study Result   [facility]  MR LUMBAR SPINE WO CONTRAST  9/25/2017 6:12 PM      INDICATION: Right radicular pain. Right hip pain.  TECHNIQUE: Routine.  CONTRAST: None.  COMPARISON: None.     FINDINGS: Nomenclature is based on 5 lumbar type vertebral bodies. Normal vertebral body heights. There is some marrow signal abnormality within the pedicles of L4 and L5, greater on the left. This could be secondary to stress reaction. No definite   fractures identified. Mild convex left lumbar curvature. No pars defect. The conus tip is identified at L1-L2. Grossly normal paraspinal soft tissues. No abdominal aortic aneurysm. The visualized portions of the bony pelvis are normal for age.     T12-L1: Diminished T2 signal without significant loss of height. Posterior annular bulge. Mild facet arthropathy. No spinal canal stenosis. No right neural foraminal stenosis. No left neural foraminal stenosis.     L1-L2: Normal disc height and signal. No herniation. Mild facet arthropathy. No spinal canal stenosis. No right neural foraminal stenosis. No left neural foraminal stenosis.     L2-L3: Diminished T2 signal and loss of height. Posterior annular bulge. Ligamentum flavum and facet arthropathy. Mild spinal canal stenosis. No right neural foraminal stenosis. No left neural foraminal stenosis.     L3-L4: Loss of T2 signal and minimal loss of height. Slight anterolisthesis of L3 on L4 measuring approximately 1.5 mm. Slight posterior annular bulge. Bilateral facet arthropathy. No spinal canal stenosis. No right neural foraminal stenosis. No left   neural foraminal  stenosis.     L4-L5: Loss of T2 signal and loss of height. Broad-based posterior annular bulge. Ligamentum flavum and facet arthropathy. Moderate to severe spinal canal stenosis. No right neural foraminal stenosis. No left neural foraminal stenosis.     L5-S1: Normal disc height and signal. No herniation. No facet arthropathy. No spinal canal stenosis. No right neural foraminal stenosis. No left neural foraminal stenosis.     IMPRESSION:   CONCLUSION:  1.  Moderate to severe spinal canal stenosis L4-L5 secondary to broad-based posterior annular bulge and ligamentum flavum and facet hypertrophy.  2.  Mild spinal canal stenosis L2-L3.  3.  Facet hypertrophy throughout the lumbar spine.  4.  Convex left lumbar curvature.  5.  Minimal anterolisthesis of L3 on L4.  6.  Nonspecific T2 signal abnormality within the pedicles at L4 and L5 suggestive of stress reaction. No fractures are identified but thin section CT could be performed if indicated.

## 2021-06-13 NOTE — PROGRESS NOTES
ASSESSMENT:     Diagnoses and all orders for this visit:    Lumbalgia  -     MR Lumbar Spine Without Contrast; Future; Expected date: 9/14/17  -     XR Hip Right 2 or More VWS; Future; Expected date: 9/14/17    Right hip pain  -     MR Lumbar Spine Without Contrast; Future; Expected date: 9/14/17  -     XR Hip Right 2 or More VWS; Future; Expected date: 9/14/17    Lumbar radiculitis  -     MR Lumbar Spine Without Contrast; Future; Expected date: 9/14/17  -     XR Hip Right 2 or More VWS; Future; Expected date: 9/14/17    Myofascial pain  -     MR Lumbar Spine Without Contrast; Future; Expected date: 9/14/17  -     XR Hip Right 2 or More VWS; Future; Expected date: 9/14/17    Spondylolisthesis at L4-L5 level  -     MR Lumbar Spine Without Contrast; Future; Expected date: 9/14/17  -     XR Hip Right 2 or More VWS; Future; Expected date: 9/14/17    Spondylolisthesis at L3-L4 level  -     MR Lumbar Spine Without Contrast; Future; Expected date: 9/14/17  -     XR Hip Right 2 or More VWS; Future; Expected date: 9/14/17    Facet arthropathy, lumbar  -     MR Lumbar Spine Without Contrast; Future; Expected date: 9/14/17  -     XR Hip Right 2 or More VWS; Future; Expected date: 9/14/17          Grazyna Bejarano is a 77 y.o. female  with a BMI of Body mass index is 26.51 kg/(m^2). who presents today in consultation at the request of Matthew Man P, *  for new patient evaluation of chronic bilateral low back pain with the last 3 years.  Patient symptoms was located more on the left lower back radiating to the left lower extremity for the past 3 years, however starting 2017 she noticed low back pain radiating to the right groin right anterior thigh, right anterior shin.  Her last MRI that was done back in November 2016 showed anterior listhesis at the L3-L4, L4-L5 with possible 4 mm hemorrhagic synovial cyst at the L3-L4 causing moderate to severe right subarticular stenosis to this descending L5 nerve root.  Patient  symptoms has changed since November 2016.  She also has positive right hip impingement on exam today.  I recommend to obtain a right hip x-ray to rule out hip impingement, and to obtain an updated lumbar MRI.  Patient verbalizes understanding and agrees with the plan.  No red flags.      ANTONIO:  34  WHO-5:  18    PLAN:  A shared decision making model was used.  The patient's values and choices were respected.  The following represents what was discussed and decided upon by the physician and the patient.      1.  DIAGNOSTIC TESTS: I reviewed the lumbar MRI reports.  No imaging available for my review today.  I ordered an updated lumbar MRI.  And I ordered right hip x-rays.  2.  PHYSICAL THERAPY:  Discussed the importance of core strengthening, ROM, stretching exercises with the patient and how each of these entities is important in decreasing pain.  Explained to the patient that the purpose of physical therapy is to teach the patient a home exercise program.  These exercises need to be performed every day in order to decrease pain and prevent future occurrences of pain.  Likened it to brushing one's teeth.  Patient will continue on home exercises.  3.  MEDICATIONS: Patient will take Advil as needed for pain.  4.  INTERVENTIONS: No therapeutic steroid injections at this time.   5.  PATIENT EDUCATION: I thoroughly discussed the plan with the patient today.  She verbalizes understanding and agrees with the plan.      FOLLOW-UP:   Patient will follow up with me in 2 weeks.  All questions were answered.      CARL Ervin, MPA-C          SUBJECTIVE:  Grazyna Bejarano  Is a 77 y.o. female who presents today for new patient evaluation of low back pain.  Patient reports bilateral low back pain for the last 3 years.  Patient states that in the last 3 years her pain was more located at the left lower back radiating to the left lower extremity.  Patient states that her symptoms are now located at the right lower back radiating  to the right groin right anterior thigh right anterior shin.  Patient had MRI of the lumbar spine that was done back in November 2016 that showed 4 mm anterolisthesis at the L4-L5 and moderate to severe right subarticular stenosis with descending L5 impingement due to possible 4 mm synovitis cyst.  The MRI also showed grade 1 spondylolisthesis L3 and L4 causing mild/moderate central and subarticular stenosis without neural impingement.  Patient states that she attended physical therapy at [facility], with several weeks and felt that physical therapy had increased right hip pain.  At this time she reports 7-8 out of 10 intensity pain in the lower back, right groin right anterior hip right anterior shin.  Her symptoms are aggravated by walking where she also has to lean forward and rates it at 10 out of 10 intensity on its worse.  Her symptoms are alleviated by taking Aleve up to 4 tablets daily for pain.  Patient denies history of back surgeries.  She denies history of motor vehicle accidents or trauma to her lower back.  Her symptoms that started 3 years ago started gradually without any preceding trauma. Patient denies urinary or bowel incontinence, unintentional weight loss, saddle anesthesia, fever or chills, balance difficulties.            Medications:    Current Outpatient Prescriptions   Medication Sig Dispense Refill     betamethasone valerate (VALISONE) 0.1 % ointment Apply topically 2 (two) times a day. prn       ibuprofen (ADVIL,MOTRIN) 200 MG tablet Take 200 mg by mouth every 6 (six) hours as needed for pain.       loratadine (CLARITIN) 10 mg tablet Take 10 mg by mouth daily.       No current facility-administered medications for this encounter.        Allergies:   Allergies   Allergen Reactions     Septra [Sulfamethoxazole-Trimethoprim]        PMH:    Past Medical History:   Diagnosis Date     Anxiety      Cancer     back of right leg     Hyperlipidemia      SOB (shortness of breath)        PSH:  No past  surgical history on file.    Family History:    Family History   Problem Relation Age of Onset     Heart disease Father        Social History:   Social History     Social History     Marital status:      Spouse name: N/A     Number of children: N/A     Years of education: N/A     Occupational History     Not on file.     Social History Main Topics     Smoking status: Former Smoker     Quit date: 8/12/2012     Smokeless tobacco: Not on file     Alcohol use Not on file     Drug use: Not on file     Sexual activity: Not on file     Other Topics Concern     Not on file     Social History Narrative       ROS: Bilateral low back pain with right side being worse than the left side with right groin pain, right anterior thigh, right anterior shin pain.  Specifically negative for bowel/bladder dysfunction, fevers,chills, appetite changes, unexplained weight loss.   Otherwise 13 systems reviewed are negative.  Please see the patient's intake questionnaire from today for details.      OBJECTIVE:  PHYSICAL EXAMINATION:    CONSTITUTIONAL:  Vital signs as above.  No acute distress.  The patient is well nourished and well groomed.  PSYCHIATRIC:  The patient is awake, alert, oriented to person, place, time and answering questions appropriately with clear speech.    SKIN:  Skin over the face, bilateral lower extremities, and posterior torso is clean, dry, intact without rashes.    GAIT:  Gait is non-antalgic.  The patient is able to heel and toe walk without significant difficulty.    STANDING EXAMINATION: Mild tenderness at the right L4-L5, L5-S1.  MUSCLE STRENGTH:  The patient has 5/5 strength for the bilateral hip flexors, knee flexors/extensors, ankle dorsiflexors/plantar flexors, great toe extensors, ankle evertors/invertors.  NEUROLOGICAL:  2/4 patellar, medial hamstring, and achilles reflexes bilaterally.  Negative Babinski's bilaterally.  No ankle clonus bilaterally. Sensation to light touch is intact in the bilateral  L4, L5, and S1 dermatomes.  VASCULAR:  2/4  pulses bilaterally.  Bilateral lower extremities are warm.  There is no pitting edema of the bilateral lower extremities.  ABDOMINAL:  Soft, non-distended, non-tender throughout all quadrants.  No pulsatile mass palpated in the left lower quadrant.  LYMPH NODES:  No palpable or tender inguinal/popliteal lymph nodes.  MUSCULOSKELETAL: Positive right hip impingement.  Negative straight leg raise bilaterally.  Negative left hip impingement.  Negative Lucien test bilaterally.      RESULTS:      Patient had MRI of the lumbar spine back in [facility] 2016 [facility].  Impression of the lumbar MRI listed in history of present illness.

## 2021-06-13 NOTE — PROGRESS NOTES
[facility] Daily Progress     Patient Name: Grazyna Bejarano  Date: 10/27/2017  Visit #: 2  PTA visit #:  -  Referral Diagnosis:       Lumbalgia [M54.5]  - Primary       Lumbar radiculitis [M54.16]       Myofascial pain [M79.1]       Spondylolisthesis at L4-L5 level [M43.16]       Spondylolisthesis at L3-L4 level [M43.16]       Right hip pain [M25.551]       Referring provider: Ezra Torres PA-C  Visit Diagnosis:     ICD-10-CM    1. Pain of right hip joint M25.551    2. Weakness of both hips R29.898    3. Chronic bilateral low back pain without sciatica M54.5     G89.29      Grazyna Bejarano is a 77 y.o. female who presents to therapy today with chief complaints of chronic right hip pain and back pain over the last 3 years. She has not history of trauma or surgery. LBP and radicular leg pain improved with injection on 9/29/17, but hip pain remains. She is limited with going up and down stairs, transitional movements, and walking 2/2 hip pain. With examination, she demonstrates mild hip ROM and strength deficits and her pain is most consistent with hip OA and possible labral tear. The patient reports improvements within session today after stretching and she will likely benefit from skilled PT to improve her hip strength and mobility as well as core stabilization to improve her pain, mobility and function.     Assessment:     HEP/POC compliance is  good .     Patient continues to report hip pain, which may be slightly worst since last session. She did feel some benefit from hip joint mobilizations and was able to progress her exercises well today. Her pain does improve with movement, but with catching type pain may be some impingement or a labral tear contributing to her pain. Will continue to promote stretching, joint mobs, and strengthening.     Goal Status:  Pt. will be independent with home exercise program in : 4 weeks  Pt. will report decreased intensity, frequency of : Pain;in 4  weeks;Comment  Comment:: 50%  Patient will decrease : ANTONIO score;by _ points;for improved quality of function;in 4 weeks  by ___ points: 30%  Pt will: be able to clean and vacuum her house with pain <3/10 in 6 weeks:  Pt will: be able to go up and down stairs in 6 weeks without fear of falling 2/2 pain:    Plan / Patient Education:     Continue with initial plan of care.     Plan for next visit: Review HEP, slowly progress hip stretching and strengthening, Hip joint mobilizations. Eventual core strengthening    Subjective:     Pain Ratin/10 currently, hurts a lot with walking.     Feels her hip is worse than when she was here last. Pain is not constant, but bothers her with walking, giong up stairs. Calms down after she gets going.     Objective:     Clamshell painful if too high  SL abduction better.  Mild decrease pain following hip mobilizations.    Treatment Today     TREATMENT MINUTES COMMENTS   Evaluation     Self-care/ Home management     Manual therapy 10 Supine RLE distraction with belt, lateral hip joint mobilizations with belt grade III for both.   Neuromuscular Re-education     Therapeutic Activity     Therapeutic Exercises 16 Review and progression of HEP   Gait training     Modality__________________                Total 26    Blank areas are intentional and mean the treatment did not include these items.       Marcelino SARMIENTO  10/27/2017

## 2021-06-14 NOTE — PROGRESS NOTES
[facility] Daily Progress     Patient Name: Grazyna Bejarano  Date: 11/15/2017  Visit #: 5  PTA visit #:  -  Referral Diagnosis:   Lumbalgia [M54.5]  - Primary       Lumbar radiculitis [M54.16]       Myofascial pain [M79.1]       Spondylolisthesis at L4-L5 level [M43.16]       Spondylolisthesis at L3-L4 level [M43.16]       Right hip pain [M25.551]       Referring provider: Ezra Torres PA-C  Visit Diagnosis:     ICD-10-CM    1. Pain of right hip joint M25.551    2. Weakness of both hips R29.898    3. Chronic bilateral low back pain without sciatica M54.5     G89.29      Grazyna Bejarano is a 77 y.o. female who presents to therapy today with chief complaints of chronic right hip pain and back pain over the last 3 years. She has not history of trauma or surgery. LBP and radicular leg pain improved with injection on 9/29/17, but hip pain remains. She is limited with going up and down stairs, transitional movements, and walking 2/2 hip pain. With examination, she demonstrates mild hip ROM and strength deficits and her pain is most consistent with hip OA and possible labral tear. The patient reports improvements within session today after stretching and she will likely benefit from skilled PT to improve her hip strength and mobility as well as core stabilization to improve her pain, mobility and function.     Assessment:     HEP/POC compliance is  good .     Patient continues to report hip pain and unsure if it is improved. She did feel some benefit from hip joint mobilizations and was able to progress her exercises well today. She reports feeling better after PT and with movement. With catching type pain may be some impingement or a labral tear contributing to her pain. Will continue to promote stretching, joint mobs, and strengthening.     Goal Status:  Pt. will be independent with home exercise program in : 4 weeks  Pt. will report decreased intensity, frequency of : Pain;in 4 weeks;Comment  Comment::  50%  Patient will decrease : ANTONIO score;by _ points;for improved quality of function;in 4 weeks  by ___ points: 30%  Pt will: be able to clean and vacuum her house with pain <3/10 in 6 weeks:  Pt will: be able to go up and down stairs in 6 weeks without fear of falling 2/2 pain:    Plan / Patient Education:     Continue with initial plan of care.     Plan for next visit: Review HEP, slowly progress hip stretching and strengthening, Hip joint mobilizations. Eventual core strengthening. Another 1 visits until f/u with referring.     Subjective:     Pain Ratin/10 currently.    The hip is bothering her a little more- but she has been doing more as well. She was getting some catching over the last couple of days, walked on treadmill.  The last couple of days. Feels some more pain in the back and feels it is due to walking differently from her hip pain.    Objective:     Mild pinch with isaías pose in front of right hip.  + hip impingement signs.  Improved following hip joint mobilizations lateral and inferior.    Treatment Today     TREATMENT MINUTES COMMENTS   Evaluation     Self-care/ Home management     Manual therapy 10 Lateral and inferior hip joint mobilizations grade III in prone with belt.   Neuromuscular Re-education     Therapeutic Activity     Therapeutic Exercises 15 Review and progression of HEP   Gait training     Modality__________________                Total 25    Blank areas are intentional and mean the treatment did not include these items.       Marcelino SARMIENTO  11/15/2017

## 2021-06-14 NOTE — PROGRESS NOTES
Assessment / Plan:     Diagnoses and all orders for this visit:    Lumbalgia  -     Ambulatory referral to Orthopedic Surgery    Lumbar radiculitis  -     Ambulatory referral to Orthopedic Surgery    Myofascial pain  -     Ambulatory referral to Orthopedic Surgery    Spondylolisthesis at L4-L5 level  -     Ambulatory referral to Orthopedic Surgery    Spondylolisthesis at L3-L4 level  -     Ambulatory referral to Orthopedic Surgery    Right hip pain  -     Ambulatory referral to Orthopedic Surgery          Grazyna Bejarano is a 77 y.o. y.o. female who presents today for follow-up regarding bilateral low back pain radiating to the lower extremity bilaterally with history of heaviness and weakness of the legs, and right hip pain.  Today patient continues to report improvement in the low back pain and the heaviness and the weakness in the lower extremity, however she continues to reports right groin pain.  Patient symptoms of the right groin pain are concerning for hip pathology.  I recommend patient to follow-up with Dr. Isaac at [facility] for further evaluation of her hip symptoms.  Cannot rule out inguinal hernia.  Patient is in agreement with the plan.    A shared decision making plan was used.  The patient's values and choices were respected.  The following represents what was discussed and decided upon by the physician and the patient.      1.  DIAGNOSTIC TESTS: I reviewed the lumbar MRI imaging and the report with the patient today.  He verbalizes understanding.  2.  PHYSICAL THERAPY: Patient will continue on physical therapy [facility] program.  3.  MEDICATIONS: Patient will continue on ibuprofen 200 mg with food as needed for pain.  4.  INTERVENTIONS: No therapeutic injections at this time.  5.  PATIENT EDUCATION: I thoroughly discussed the plan with the patient today.  She verbalizes understanding and agrees with the plan.  6.  FOLLOW-UP: Patient will follow up with me in as needed.  All questions were  answered.      CARL Ervin, MPA-C      Subjective:     Grazyna Bejarano is a 77 y.o. female who presents today for follow-up regarding low back pain, and right groin pain.  Today patient returns to the clinic reporting mild right-sided low back pain, and right groin pain.  Patient stated that physical therapy did not help with her right groin pain.  She describes a deep right anterior medial groin pain.  At this time she reports 4 out of 10 intensity pain in the right groin is worse with standing up from a sitting position, turning and rates it at 8 out of 10 intensity on its worse.  Patient reports improvement and low back pain and lower extremity pain with interlaminar L5-S1 was done back in September 29, 2017.  Patient had right hip x-rays that was done on September 25 of 2017 that showed small osteophytes in the femoral head with mild joint space narrowing.  There was no fracture or dislocation.  Her symptoms were consistent with mild to moderate degenerative joint disease.  Patient is frustrated with the ongoing right groin pain.  She denies history of hernia.Patient denies urinary or bowel incontinence, unintentional weight loss, saddle anesthesia, fever or chills, balance difficulties.      Review of Systems:  Right groin pain. Patient denies urinary or bowel incontinence, unintentional weight loss, saddle anesthesia, fever or chills, balance difficulties.       Objective:   CONSTITUTIONAL:  Vital signs as above.  No acute distress.  The patient is well nourished and well groomed.    PSYCHIATRIC:  The patient is awake, alert, oriented to person, place and time.  The patient is answering questions appropriately with clear speech.  Normal affect.  SKIN:  Skin over the face, posterior torso, bilateral upper and lower extremities is clean, dry, intact without rashes.  MUSCULOSKELETAL:  Gait is non-antalgic.  The patient is able to heel and toe walk without any difficulty.  Mild tenderness over the L4-L5,  L5-S1 lumbar paraspinal muscles.  Mild tenderness at the right groin region.    The patient has 5/5 strength for the bilateral hip flexors, knee flexors/extensors, ankle dorsiflexors/plantar flexors, ankle evertors/invertors.    NEUROLOGICAL:  2/4 patellar, medial hamstring, achilles reflexes which are symmetric bilaterally.  No ankle clonus bilaterally.  Sensation to light touch is intact in the bilateral L4, L5, and S1 dermatomes.    Right hip pain with right hip impingement test.  Negative straight leg raise bilaterally.  Negative Lucien test bilaterally.    RESULTS:      XR HIP RIGHT 2 OR MORE VWS  9/25/2017 5:09 PM      INDICATION: Hip pain.      COMPARISON: None.      FINDINGS: Small osteophytes are seen on the femoral head. Is mild joint space narrowing. No fractures or other osseous abnormalities are identified. The findings are consistent with mild to moderate degenerative joint disease.

## 2021-06-14 NOTE — PROGRESS NOTES
[facility] Daily Progress     Patient Name: Grazyna Bejarano  Date: 11/8/2017  Visit #: 5  PTA visit #:  -  Referral Diagnosis:   Lumbalgia [M54.5]  - Primary       Lumbar radiculitis [M54.16]       Myofascial pain [M79.1]       Spondylolisthesis at L4-L5 level [M43.16]       Spondylolisthesis at L3-L4 level [M43.16]       Right hip pain [M25.551]       Referring provider: Ezra Torres PA-C  Visit Diagnosis:     ICD-10-CM    1. Pain of right hip joint M25.551    2. Weakness of both hips R29.898    3. Chronic bilateral low back pain without sciatica M54.5     G89.29      Grazyna Bejarano is a 77 y.o. female who presents to therapy today with chief complaints of chronic right hip pain and back pain over the last 3 years. She has not history of trauma or surgery. LBP and radicular leg pain improved with injection on 9/29/17, but hip pain remains. She is limited with going up and down stairs, transitional movements, and walking 2/2 hip pain. With examination, she demonstrates mild hip ROM and strength deficits and her pain is most consistent with hip OA and possible labral tear. The patient reports improvements within session today after stretching and she will likely benefit from skilled PT to improve her hip strength and mobility as well as core stabilization to improve her pain, mobility and function.     Assessment:     HEP/POC compliance is  good .     Patient continues to report hip pain and unsure if it is improved. She did feel some benefit from hip joint mobilizations and was able to progress her exercises well today. She reports feeling better after PT and with movement. With catching type pain may be some impingement or a labral tear contributing to her pain. Will continue to promote stretching, joint mobs, and strengthening.     Goal Status:  Pt. will be independent with home exercise program in : 4 weeks  Pt. will report decreased intensity, frequency of : Pain;in 4 weeks;Comment  Comment::  50%  Patient will decrease : ANTONIO score;by _ points;for improved quality of function;in 4 weeks  by ___ points: 30%  Pt will: be able to clean and vacuum her house with pain <3/10 in 6 weeks:  Pt will: be able to go up and down stairs in 6 weeks without fear of falling 2/2 pain:    Plan / Patient Education:     Continue with initial plan of care.     Plan for next visit: Review HEP, slowly progress hip stretching and strengthening, Hip joint mobilizations. Eventual core strengthening. Another 2 visits until f/u with referring.     Subjective:     Pain Ratin/10 currently.    Had some discomfort when she got out of bed this AM- started yesterday in the evening, was getting some catching in the front of the hip. She was able to do some walking and be careful with movement, she will get a catch when she twists.    Objective:     Clamshell painfree today  Mild pinch with isaías pose in front of right hip.    Treatment Today     TREATMENT MINUTES COMMENTS   Evaluation     Self-care/ Home management     Manual therapy 3 Anterior hip joint mobilizations grade III in prone.   Neuromuscular Re-education     Therapeutic Activity     Therapeutic Exercises 25 Review and progression of HEP   Gait training     Modality__________________                Total 28    Blank areas are intentional and mean the treatment did not include these items.       Marcelino SARMIENTO  2017

## 2021-06-15 NOTE — PROGRESS NOTES
[facility] Discharge Summary  Patient Name: Grazyna Bejarano  Date: 12/28/2017  Referral Diagnosis:   Lumbalgia [M54.5]  - Primary       Lumbar radiculitis [M54.16]       Myofascial pain [M79.1]       Spondylolisthesis at L4-L5 level [M43.16]       Spondylolisthesis at L3-L4 level [M43.16]       Right hip pain [M25.551]       Referring provider: Ezra Torres PA-C  Visit Diagnosis:   1. Pain of right hip joint     2. Weakness of both hips     3. Chronic bilateral low back pain without sciatica         Goals:  Pt. will be independent with home exercise program in : 4 weeks Met  Pt. will report decreased intensity, frequency of : Pain;in 4 weeks;Comment  Comment:: 50% Improved for back pain  Patient will decrease : ANTONIO score;by _ points;for improved quality of function;in 4 weeks  by ___ points: 30% NT  Pt will: be able to clean and vacuum her house with pain <3/10 in 6 weeks: NT  Pt will: be able to go up and down stairs in 6 weeks without fear of falling 2/2 pain: Hip still bothersome    Patient was seen for 5 visits from 10/18/17 to 11/15/17 with 0 missed appointments.    The patient did well with PT with improvements in back pain. She remained limited mostly by her hip with activities such as stairs, transitional movements, and walking. Her pain seemed most consistent with a hip impingement. She was sent to orthopedics by referring provider.    Therapy will be discontinued at this time.  The patient will need a new referral to resume.    Thank you for your referral.  Marcelino SARMIENTO  12/28/2017  1:48 PM

## 2021-06-15 NOTE — PROGRESS NOTES
NEUROSURGERY CONSULTATION NOTE:    Assessment:   Spondylolisthesis, L4-5 and L3-4  Bilateral pedicle edema at L4-5    Plan: I have recommended a thin cut CT from L4 through the sacrum.  Also asked for flexion-extension lumbar spine x-rays.  She return after these are completed.    Grazyna Bejarano   [address] [place] [address]  77 y.o. female is sent to me in consultation   by Matthew North MD     CC:    Chief Complaint   Patient presents with     Back Pain       HPI:  Neurosurgery consultation was requested by: Dr. Matthew North   Pt was told she has a laberal tear  Pain: Lower back pain   Radicular Pain is present: Radiates into right hip and right groin pain. Pain down front of legs   Lhermitte sign: NO  Motor complaints: Weakness in both legs   Sensory complaints: Denies   Gait and balance issues: Sometimes   Bowel or bladder issues: Denies   Duration of SX is:4 years,progressed in the last 2 years   The symptoms are worse with: As day goes on   The symptoms are better with: Mornings   Injury: Denies   Severity is: Mild - moderate  Patient has tried the following conservative measures:Interlaminar L5-S1 injection and had immediate relief and lasted 2-3 weeks. She has also done Pt and was making her worse.   ANTONIO score is:40%      PROBLEM LIST:  Severe lumbar stenosis noted at L4-5  Spondylolisthesis and pedicle edema    REVIEW OF SYSTEMS:  A 12 point review of systems is positive for a labral tear in the right hip.  This has improved since onset.  She does have some issues with anxiety.  The neurological review is above.  Otherwise, the review is negative.      Past Medical History:   Diagnosis Date     Anxiety      Cancer     back of right leg     Hyperlipidemia      SOB (shortness of breath)          Past Surgical History:   Procedure Laterality Date     cataracts       EYE SURGERY       HYSTERECTOMY       TRIGGER FINGER RELEASE           MEDICATIONS:  Current Outpatient Prescriptions  "  Medication Sig Dispense Refill     betamethasone valerate (VALISONE) 0.1 % ointment Apply topically 2 (two) times a day. prn       ibuprofen (ADVIL,MOTRIN) 200 MG tablet Take 200 mg by mouth every 6 (six) hours as needed for pain.       loratadine (CLARITIN) 10 mg tablet Take 10 mg by mouth daily.       No current facility-administered medications for this visit.          ALLERGIES/SENSITIVITIES:     Allergies   Allergen Reactions     Septra [Sulfamethoxazole-Trimethoprim]        PERTINENT SOCIAL HISTORY:   Social History     Social History     Marital status:      Spouse name: N/A     Number of children: N/A     Years of education: N/A     Social History Main Topics     Smoking status: Former Smoker     Quit date: 8/12/2012     Smokeless tobacco: Never Used     Alcohol use None     Drug use: None     Sexual activity: Not Asked       FAMILY HISTORY:  Family History   Problem Relation Age of Onset     Heart disease Father         PHYSICAL EXAM:   Constitutional: /63  Pulse 76  Ht 5' 2.5\" (1.588 m)  Wt 145 lb (65.8 kg)  SpO2 96%  BMI 26.1 kg/m2    General appearance: Appropriately groomed.  No acute distress.  Interactive.     Mental Status: Mental status: Alert and oriented, mood and affect appropriate, language reception and expression normal, recent and remote memory is normal, higher cortical function normal. Attention span, concentration and ability to follow commands is normal.       Cranial Nerves: Face is symmetric.  Extraocular movements are full, conjugate and without nystagmus.  Hearing is preserved.  Shoulder position is symmetric.  Tongue is midline with normal motion.       Motor: Motor exam nl bilateral LE, to confrontation testing. Tone nl, bulk nl and strength 5/5 all groups.      Sensory: Sensory exam by subjective report intact to LT,PP,Position and Vib. in the UE and  LE.     Station and Gait:  Station and Gait- nl stride length,  balance and aren..     Reflexes; supinator, " biceps, triceps, knee/ ankle jerk intact. No hoffmans/babinski/ clonus.    IMAGING:  I have personally reviewed the images and discussed the findings with Grazyna Bejarano.  She has marked spinal stenosis at L4-5 due to broad-based annular bulge associated with facet hypertrophy.  She has changes in the pedicles at L4 and L5 that suggest a stress reaction.    CC:     Matthew North MD[address] [place], [place] [id number]

## 2021-06-15 NOTE — PROGRESS NOTES
Neurosurgery consultation was requested by: Dr. Matthew North   Pt was told she has a laberal tear  Pain: Lower back pain   Radicular Pain is present: Radiates into right hip and right groin pain. Pain down front of legs   Lhermitte sign: NO  Motor complaints: Weakness in both legs   Sensory complaints: Denies   Gait and balance issues: Sometimes   Bowel or bladder issues: Denies   Duration of SX is:4 years,progressed in the last 2 years   The symptoms are worse with: As day goes on   The symptoms are better with: Mornings   Injury: Denies   Severity is: Mild - moderate  Patient has tried the following conservative measures:Interlaminar L5-S1 injection and had immediate relief and lasted 2-3 weeks. She has also done Pt and was making her worse.   ANTONIO score is:40%  ENOCH Perez

## 2021-06-16 NOTE — PROGRESS NOTES
Grazyna Chavez is here for her 3 week follow up for her symptoms.     She has kept a journal for the three weeks and has brought it with her today.   She is also back to discuss what Dr. Jacques and Dr. Stauffer had discussed over the phone.   Grazyna Bronson states that her back pain has not been as severe lately, but states it also depends on her activity level.   She still has the right groin and hip pain and weakness in both legs. She does not have as much pain in left leg now.   ANTONIO today is 38%    She is bothered terribly by her right groin pain.  I did speak with Dr. Jacques and he is willing to take a fresh look at things.  I personally called and arranged for him to contact her for an appointment.    She still has a lumbar spinal stenosis.  I did advise her that the decompression using a minimally invasive mindset coming in with a moira-approach on the right would be reasonable.  She does have a symptomatic lumbar spinal stenosis with neurogenic claudication, however this is improved via her diary.    I will see her back after she visits with Dr. Jacques.  Time spent in review of information, documentation, coordination of care and face-to-face discussion was 15 minutes.  I reiterated that I do not think she needs a lumbar fusion.  She has no slip between flexion and extension with no pars defect.    Grazyna Stauffer MD, FACS, FAANS

## 2021-06-16 NOTE — PROGRESS NOTES
Grazyna Bronson is here to review CT and x-rays. She states symptoms are unchanged since last seen in clinic. She c/o LBP that radiates into right hip and groin and thigh and pain down front of legs. She also has weakness in both legs on and off.   ANTONIO today is 38%  Chris,CMA

## 2021-06-16 NOTE — PROGRESS NOTES
"Grazyna Bronson is here to review CT and x-rays.     She states symptoms are unchanged since last seen in clinic.   She c/o LBP that radiates into right hip and groin, thigh and pain down front of legs.   She also has weakness in both legs, on and off.   ANTONIO today is 38%    Past Medical History:   Diagnosis Date     Anxiety      Cancer     back of right leg     Hyperlipidemia      SOB (shortness of breath)      Past Surgical History:   Procedure Laterality Date     cataracts       EYE SURGERY       HYSTERECTOMY       TRIGGER FINGER RELEASE       Allergies:  Septra [sulfamethoxazole-trimethoprim]    Current Outpatient Prescriptions on File Prior to Visit   Medication Sig Dispense Refill     betamethasone valerate (VALISONE) 0.1 % ointment Apply topically 2 (two) times a day. prn       ibuprofen (ADVIL,MOTRIN) 200 MG tablet Take 200 mg by mouth every 6 (six) hours as needed for pain.       loratadine (CLARITIN) 10 mg tablet Take 10 mg by mouth daily.       Family history is unchanged    Exam:  /62  Pulse 78  Ht 5' 2.5\" (1.588 m)  Wt 145 lb (65.8 kg)  SpO2 95%  BMI 26.1 kg/m2    Mental status: Alert and oriented, mood and affect appropriate, language reception and expression normal, recent and remote memory is normal, higher cortical function normal. Attention span, concentration and ability to follow commands is normal.  Cranial nerves II through XII are grossly intact.    She continues to have weakness in the lower extremity related to her pain at the hip on the right.    Flexion extension x-rays show no movement between L4-5 and L5-S1.    The thin cut CT scan does not show any pars fracture or other edema indicating active movement at these levels.    Assessment: L4-5 lumbar stenosis with spondylolisthesis    Plan: Due to her lack of movement between flexion extension on lumbar spine films, I am inclined to do a very conservative decompression related to her right lower extremity symptoms.  I think that the risk " of her developing further destabilization is reduced due to the CT scan that shows no significant pedicular fracture.  This would be the conservative approach.  I do want to talk with Dr. Adalberto Jacques regarding her labral tear.  This was identified on her right hip MRI.  Maybe she should have a scope analysis of the right hip and possible repair.  I do think a multilevel lumbar fusion is not advisable, at this point.  She appeared to understand the discussion and will return to see me in about 2-3 weeks with a diary of her ongoing pain symptoms.    Grazyna Stauffer MD, FACS, FAANS    Cc Matthew North MD

## 2021-06-16 NOTE — PROGRESS NOTES
Grazyna Chavez is here for her 3 week follow up for her symptoms. She has kept a journal for the three weeks and has brought it with her today. She is also back to discuss what Dr. Jacques and Dr. Stauffer had discussed over the phone. Grazyna Bronson states that her back pain has not been as severe lately but states it also depends on her activity level. She still has the right groin and hip pain and weakness in both legs. She does not have as much pain in left leg now.   ANTONIO today is 38%  ENOCH Perez

## 2021-08-15 ENCOUNTER — HEALTH MAINTENANCE LETTER (OUTPATIENT)
Age: 81
End: 2021-08-15

## 2021-10-11 ENCOUNTER — HEALTH MAINTENANCE LETTER (OUTPATIENT)
Age: 81
End: 2021-10-11

## 2021-10-28 ENCOUNTER — LAB REQUISITION (OUTPATIENT)
Dept: LAB | Facility: CLINIC | Age: 81
End: 2021-10-28
Payer: MEDICARE

## 2021-10-28 DIAGNOSIS — M48.062 SPINAL STENOSIS, LUMBAR REGION WITH NEUROGENIC CLAUDICATION: ICD-10-CM

## 2021-10-28 LAB
ANION GAP SERPL CALCULATED.3IONS-SCNC: 8 MMOL/L (ref 5–18)
BUN SERPL-MCNC: 12 MG/DL (ref 8–28)
CALCIUM SERPL-MCNC: 9.7 MG/DL (ref 8.5–10.5)
CHLORIDE BLD-SCNC: 104 MMOL/L (ref 98–107)
CO2 SERPL-SCNC: 28 MMOL/L (ref 22–31)
CREAT SERPL-MCNC: 0.94 MG/DL (ref 0.6–1.1)
GFR SERPL CREATININE-BSD FRML MDRD: 56 ML/MIN/1.73M2
GLUCOSE BLD-MCNC: 104 MG/DL (ref 70–125)
POTASSIUM BLD-SCNC: 4.8 MMOL/L (ref 3.5–5)
SODIUM SERPL-SCNC: 140 MMOL/L (ref 136–145)

## 2021-10-28 PROCEDURE — 80048 BASIC METABOLIC PNL TOTAL CA: CPT | Mod: ORL | Performed by: FAMILY MEDICINE

## 2021-10-29 ENCOUNTER — IMMUNIZATION (OUTPATIENT)
Dept: NURSING | Facility: CLINIC | Age: 81
End: 2021-10-29
Payer: MEDICARE

## 2021-10-29 PROCEDURE — 0004A PR COVID VAC PFIZER DIL RECON 30 MCG/0.3 ML IM: CPT

## 2021-10-29 PROCEDURE — 91300 PR COVID VAC PFIZER DIL RECON 30 MCG/0.3 ML IM: CPT

## 2022-01-17 ENCOUNTER — LAB REQUISITION (OUTPATIENT)
Dept: LAB | Facility: CLINIC | Age: 82
End: 2022-01-17
Payer: MEDICARE

## 2022-01-17 DIAGNOSIS — Z01.818 ENCOUNTER FOR OTHER PREPROCEDURAL EXAMINATION: ICD-10-CM

## 2022-01-17 LAB
ANION GAP SERPL CALCULATED.3IONS-SCNC: 11 MMOL/L (ref 5–18)
BUN SERPL-MCNC: 11 MG/DL (ref 8–28)
CALCIUM SERPL-MCNC: 9.7 MG/DL (ref 8.5–10.5)
CHLORIDE BLD-SCNC: 107 MMOL/L (ref 98–107)
CO2 SERPL-SCNC: 23 MMOL/L (ref 22–31)
CREAT SERPL-MCNC: 0.81 MG/DL (ref 0.6–1.1)
GFR SERPL CREATININE-BSD FRML MDRD: 72 ML/MIN/1.73M2
GLUCOSE BLD-MCNC: 102 MG/DL (ref 70–125)
POTASSIUM BLD-SCNC: 4.1 MMOL/L (ref 3.5–5)
SODIUM SERPL-SCNC: 141 MMOL/L (ref 136–145)

## 2022-01-17 PROCEDURE — 80048 BASIC METABOLIC PNL TOTAL CA: CPT | Mod: ORL | Performed by: FAMILY MEDICINE

## 2022-01-21 ENCOUNTER — LAB REQUISITION (OUTPATIENT)
Dept: LAB | Facility: CLINIC | Age: 82
End: 2022-01-21
Payer: MEDICARE

## 2022-01-21 DIAGNOSIS — Z01.812 ENCOUNTER FOR PREPROCEDURAL LABORATORY EXAMINATION: ICD-10-CM

## 2022-01-21 LAB — SARS-COV-2 RNA RESP QL NAA+PROBE: NEGATIVE

## 2022-01-21 PROCEDURE — U0005 INFEC AGEN DETEC AMPLI PROBE: HCPCS | Mod: ORL | Performed by: FAMILY MEDICINE

## 2022-04-04 ENCOUNTER — LAB REQUISITION (OUTPATIENT)
Dept: LAB | Facility: CLINIC | Age: 82
End: 2022-04-04
Payer: MEDICARE

## 2022-04-04 DIAGNOSIS — Z01.818 ENCOUNTER FOR OTHER PREPROCEDURAL EXAMINATION: ICD-10-CM

## 2022-04-04 LAB
ANION GAP SERPL CALCULATED.3IONS-SCNC: 9 MMOL/L (ref 5–18)
BUN SERPL-MCNC: 13 MG/DL (ref 8–28)
CALCIUM SERPL-MCNC: 9.5 MG/DL (ref 8.5–10.5)
CHLORIDE BLD-SCNC: 105 MMOL/L (ref 98–107)
CO2 SERPL-SCNC: 25 MMOL/L (ref 22–31)
CREAT SERPL-MCNC: 0.84 MG/DL (ref 0.6–1.1)
GFR SERPL CREATININE-BSD FRML MDRD: 69 ML/MIN/1.73M2
GLUCOSE BLD-MCNC: 98 MG/DL (ref 70–125)
POTASSIUM BLD-SCNC: 4.2 MMOL/L (ref 3.5–5)
SODIUM SERPL-SCNC: 139 MMOL/L (ref 136–145)

## 2022-04-04 PROCEDURE — 80048 BASIC METABOLIC PNL TOTAL CA: CPT | Mod: ORL | Performed by: FAMILY MEDICINE

## 2022-04-08 ENCOUNTER — LAB REQUISITION (OUTPATIENT)
Dept: LAB | Facility: CLINIC | Age: 82
End: 2022-04-08
Payer: MEDICARE

## 2022-04-08 DIAGNOSIS — Z01.818 ENCOUNTER FOR OTHER PREPROCEDURAL EXAMINATION: ICD-10-CM

## 2022-04-08 PROCEDURE — U0003 INFECTIOUS AGENT DETECTION BY NUCLEIC ACID (DNA OR RNA); SEVERE ACUTE RESPIRATORY SYNDROME CORONAVIRUS 2 (SARS-COV-2) (CORONAVIRUS DISEASE [COVID-19]), AMPLIFIED PROBE TECHNIQUE, MAKING USE OF HIGH THROUGHPUT TECHNOLOGIES AS DESCRIBED BY CMS-2020-01-R: HCPCS | Mod: ORL | Performed by: FAMILY MEDICINE

## 2022-04-09 LAB — SARS-COV-2 RNA RESP QL NAA+PROBE: NEGATIVE

## 2022-09-24 ENCOUNTER — HEALTH MAINTENANCE LETTER (OUTPATIENT)
Age: 82
End: 2022-09-24

## 2023-03-09 ENCOUNTER — TRANSCRIBE ORDERS (OUTPATIENT)
Dept: RESPIRATORY THERAPY | Facility: CLINIC | Age: 83
End: 2023-03-09
Payer: MEDICARE

## 2023-03-09 DIAGNOSIS — R06.09 EXERTIONAL DYSPNEA: Primary | ICD-10-CM

## 2023-04-04 RX ORDER — ALBUTEROL SULFATE 0.83 MG/ML
2.5 SOLUTION RESPIRATORY (INHALATION) ONCE
Status: COMPLETED | OUTPATIENT
Start: 2023-04-05 | End: 2023-04-05

## 2023-04-05 ENCOUNTER — HOSPITAL ENCOUNTER (OUTPATIENT)
Dept: RESPIRATORY THERAPY | Facility: CLINIC | Age: 83
Discharge: HOME | End: 2023-04-05
Attending: FAMILY MEDICINE | Admitting: FAMILY MEDICINE
Payer: MEDICARE

## 2023-04-05 DIAGNOSIS — R06.09 EXERTIONAL DYSPNEA: Primary | ICD-10-CM

## 2023-04-05 LAB
DLCOCOR-%PRED-PRE: 59 %
DLCOCOR-PRE: 10.54 ML/MIN/MMHG
DLCOUNC-%PRED-PRE: 61 %
DLCOUNC-PRE: 10.76 ML/MIN/MMHG
DLCOUNC-PRED: 17.62 ML/MIN/MMHG
ERV-%PRED-PRE: 47 %
ERV-PRE: 0.22 L
ERV-PRED: 0.45 L
EXPTIME-PRE: 5.86 SEC
FEF2575-%PRED-POST: 79 %
FEF2575-%PRED-PRE: 59 %
FEF2575-POST: 1.05 L/SEC
FEF2575-PRE: 0.8 L/SEC
FEF2575-PRED: 1.33 L/SEC
FEFMAX-%PRED-PRE: 80 %
FEFMAX-PRE: 3.34 L/SEC
FEFMAX-PRED: 4.17 L/SEC
FEV1-%PRED-PRE: 76 %
FEV1-PRE: 1.24 L
FEV1FEV6-PRE: 72 %
FEV1FEV6-PRED: 77 %
FEV1FVC-PRE: 72 %
FEV1FVC-PRED: 78 %
FEV1SVC-PRE: 68 %
FEV1SVC-PRED: 63 %
FIFMAX-PRE: 3.43 L/SEC
FRCPLETH-%PRED-PRE: 102 %
FRCPLETH-PRE: 2.71 L
FRCPLETH-PRED: 2.64 L
FVC-%PRED-PRE: 81 %
FVC-PRE: 1.73 L
FVC-PRED: 2.12 L
HGB BLD-MCNC: 14.1 G/DL (ref 11.7–15.7)
IC-%PRED-PRE: 75 %
IC-PRE: 1.6 L
IC-PRED: 2.13 L
RVPLETH-%PRED-PRE: 112 %
RVPLETH-PRE: 2.5 L
RVPLETH-PRED: 2.22 L
TLCPLETH-%PRED-PRE: 92 %
TLCPLETH-PRE: 4.32 L
TLCPLETH-PRED: 4.69 L
VA-%PRED-PRE: 90 %
VA-PRE: 3.88 L
VC-%PRED-PRE: 70 %
VC-PRE: 1.82 L
VC-PRED: 2.58 L

## 2023-04-05 PROCEDURE — 94060 EVALUATION OF WHEEZING: CPT | Mod: 26 | Performed by: INTERNAL MEDICINE

## 2023-04-05 PROCEDURE — 85018 HEMOGLOBIN: CPT

## 2023-04-05 PROCEDURE — 94729 DIFFUSING CAPACITY: CPT

## 2023-04-05 PROCEDURE — 94060 EVALUATION OF WHEEZING: CPT

## 2023-04-05 PROCEDURE — 94726 PLETHYSMOGRAPHY LUNG VOLUMES: CPT | Mod: 26 | Performed by: INTERNAL MEDICINE

## 2023-04-05 PROCEDURE — 250N000009 HC RX 250

## 2023-04-05 PROCEDURE — 36415 COLL VENOUS BLD VENIPUNCTURE: CPT

## 2023-04-05 PROCEDURE — 94726 PLETHYSMOGRAPHY LUNG VOLUMES: CPT

## 2023-04-05 PROCEDURE — 94729 DIFFUSING CAPACITY: CPT | Mod: 26 | Performed by: INTERNAL MEDICINE

## 2023-04-05 PROCEDURE — 999N000157 HC STATISTIC RCP TIME EA 10 MIN

## 2023-04-05 RX ADMIN — ALBUTEROL SULFATE 2.5 MG: 2.5 SOLUTION RESPIRATORY (INHALATION) at 13:11

## 2023-04-05 NOTE — PROGRESS NOTES
RESPIRATORY CARE NOTE    Complete Pulmonary Function Test completed by patient.  Good patient effort and cooperation. Albuterol 2.5 mg neb given for bronchodilation.  The results of this test meet the ATS standards for acceptability and repeatability. PT returned to baseline and left in no distress.    Lacie Celeste, RT  4/5/2023

## 2023-05-08 ENCOUNTER — HEALTH MAINTENANCE LETTER (OUTPATIENT)
Age: 83
End: 2023-05-08

## 2024-05-17 ENCOUNTER — LAB REQUISITION (OUTPATIENT)
Dept: LAB | Facility: CLINIC | Age: 84
End: 2024-05-17
Payer: MEDICARE

## 2024-05-17 DIAGNOSIS — M48.062 SPINAL STENOSIS, LUMBAR REGION WITH NEUROGENIC CLAUDICATION: ICD-10-CM

## 2024-05-17 PROCEDURE — 80048 BASIC METABOLIC PNL TOTAL CA: CPT | Mod: ORL | Performed by: FAMILY MEDICINE

## 2024-05-18 LAB
ANION GAP SERPL CALCULATED.3IONS-SCNC: 11 MMOL/L (ref 7–15)
BUN SERPL-MCNC: 14.4 MG/DL (ref 8–23)
CALCIUM SERPL-MCNC: 9.4 MG/DL (ref 8.8–10.2)
CHLORIDE SERPL-SCNC: 103 MMOL/L (ref 98–107)
CREAT SERPL-MCNC: 0.84 MG/DL (ref 0.51–0.95)
DEPRECATED HCO3 PLAS-SCNC: 26 MMOL/L (ref 22–29)
EGFRCR SERPLBLD CKD-EPI 2021: 68 ML/MIN/1.73M2
GLUCOSE SERPL-MCNC: 114 MG/DL (ref 70–99)
POTASSIUM SERPL-SCNC: 4.9 MMOL/L (ref 3.4–5.3)
SODIUM SERPL-SCNC: 140 MMOL/L (ref 135–145)

## 2024-07-14 ENCOUNTER — HEALTH MAINTENANCE LETTER (OUTPATIENT)
Age: 84
End: 2024-07-14

## 2025-05-15 ENCOUNTER — LAB REQUISITION (OUTPATIENT)
Dept: LAB | Facility: CLINIC | Age: 85
End: 2025-05-15
Payer: MEDICARE

## 2025-05-15 PROCEDURE — 87081 CULTURE SCREEN ONLY: CPT | Mod: ORL

## 2025-05-19 LAB — BACTERIA SPEC CULT: NORMAL

## 2025-06-16 ENCOUNTER — LAB REQUISITION (OUTPATIENT)
Dept: LAB | Facility: CLINIC | Age: 85
End: 2025-06-16
Payer: MEDICARE

## 2025-06-16 DIAGNOSIS — R41.89 OTHER SYMPTOMS AND SIGNS INVOLVING COGNITIVE FUNCTIONS AND AWARENESS: ICD-10-CM

## 2025-06-16 DIAGNOSIS — M43.16 SPONDYLOLISTHESIS, LUMBAR REGION: ICD-10-CM

## 2025-06-16 DIAGNOSIS — R53.83 OTHER FATIGUE: ICD-10-CM

## 2025-06-16 PROCEDURE — 80048 BASIC METABOLIC PNL TOTAL CA: CPT | Mod: ORL | Performed by: FAMILY MEDICINE

## 2025-06-16 PROCEDURE — 82607 VITAMIN B-12: CPT | Mod: ORL | Performed by: FAMILY MEDICINE

## 2025-06-16 PROCEDURE — 84443 ASSAY THYROID STIM HORMONE: CPT | Mod: ORL | Performed by: FAMILY MEDICINE

## 2025-06-17 LAB
ANION GAP SERPL CALCULATED.3IONS-SCNC: 12 MMOL/L (ref 7–15)
BUN SERPL-MCNC: 11.9 MG/DL (ref 8–23)
CALCIUM SERPL-MCNC: 9.7 MG/DL (ref 8.8–10.4)
CHLORIDE SERPL-SCNC: 105 MMOL/L (ref 98–107)
CREAT SERPL-MCNC: 0.9 MG/DL (ref 0.51–0.95)
EGFRCR SERPLBLD CKD-EPI 2021: 62 ML/MIN/1.73M2
GLUCOSE SERPL-MCNC: 94 MG/DL (ref 70–99)
HCO3 SERPL-SCNC: 22 MMOL/L (ref 22–29)
POTASSIUM SERPL-SCNC: 4.4 MMOL/L (ref 3.4–5.3)
SODIUM SERPL-SCNC: 139 MMOL/L (ref 135–145)
TSH SERPL DL<=0.005 MIU/L-ACNC: 3.59 UIU/ML (ref 0.3–4.2)
VIT B12 SERPL-MCNC: 545 PG/ML (ref 232–1245)

## 2025-06-28 ENCOUNTER — HEALTH MAINTENANCE LETTER (OUTPATIENT)
Age: 85
End: 2025-06-28